# Patient Record
Sex: MALE | Race: WHITE | Employment: FULL TIME | URBAN - METROPOLITAN AREA
[De-identification: names, ages, dates, MRNs, and addresses within clinical notes are randomized per-mention and may not be internally consistent; named-entity substitution may affect disease eponyms.]

---

## 2017-09-05 ENCOUNTER — TELEPHONE (OUTPATIENT)
Dept: ORTHOPEDIC SURGERY | Age: 42
End: 2017-09-05

## 2017-09-11 ENCOUNTER — OFFICE VISIT (OUTPATIENT)
Dept: ORTHOPEDIC SURGERY | Age: 42
End: 2017-09-11

## 2017-09-11 VITALS — RESPIRATION RATE: 16 BRPM | HEIGHT: 74 IN | WEIGHT: 208 LBS | BODY MASS INDEX: 26.69 KG/M2

## 2017-09-11 DIAGNOSIS — R52 PAIN: ICD-10-CM

## 2017-09-11 DIAGNOSIS — S62.339A BOXERS FRACTURE, CLOSED, INITIAL ENCOUNTER: Primary | ICD-10-CM

## 2017-09-11 PROCEDURE — 99203 OFFICE O/P NEW LOW 30 MIN: CPT | Performed by: ORTHOPAEDIC SURGERY

## 2017-09-11 PROCEDURE — 73130 X-RAY EXAM OF HAND: CPT | Performed by: ORTHOPAEDIC SURGERY

## 2017-09-11 PROCEDURE — 26600 TREAT METACARPAL FRACTURE: CPT | Performed by: ORTHOPAEDIC SURGERY

## 2017-09-11 ASSESSMENT — ENCOUNTER SYMPTOMS
EYES NEGATIVE: 1
RESPIRATORY NEGATIVE: 1
GASTROINTESTINAL NEGATIVE: 1

## 2017-09-18 ENCOUNTER — OFFICE VISIT (OUTPATIENT)
Dept: ORTHOPEDIC SURGERY | Age: 42
End: 2017-09-18

## 2017-09-18 DIAGNOSIS — R52 PAIN: Primary | ICD-10-CM

## 2017-09-18 DIAGNOSIS — S62.339A BOXERS FRACTURE, CLOSED, INITIAL ENCOUNTER: ICD-10-CM

## 2017-09-18 PROCEDURE — 73130 X-RAY EXAM OF HAND: CPT | Performed by: ORTHOPAEDIC SURGERY

## 2017-09-18 PROCEDURE — 99024 POSTOP FOLLOW-UP VISIT: CPT | Performed by: ORTHOPAEDIC SURGERY

## 2017-10-02 ENCOUNTER — OFFICE VISIT (OUTPATIENT)
Dept: ORTHOPEDIC SURGERY | Age: 42
End: 2017-10-02

## 2017-10-02 VITALS — WEIGHT: 208 LBS | BODY MASS INDEX: 26.69 KG/M2 | HEIGHT: 74 IN | RESPIRATION RATE: 16 BRPM

## 2017-10-02 DIAGNOSIS — R52 PAIN: ICD-10-CM

## 2017-10-02 DIAGNOSIS — S62.339A BOXERS FRACTURE, CLOSED, INITIAL ENCOUNTER: Primary | ICD-10-CM

## 2017-10-02 PROCEDURE — 99024 POSTOP FOLLOW-UP VISIT: CPT | Performed by: ORTHOPAEDIC SURGERY

## 2017-10-02 PROCEDURE — 73130 X-RAY EXAM OF HAND: CPT | Performed by: ORTHOPAEDIC SURGERY

## 2017-10-02 NOTE — PROGRESS NOTES
Scribe Authentication Statement  Laurel Olson, scribed portions of this documentation for and in the presence of Dr. Netta Shields DO on 10/2/17 at 11:06 AM.    Provider Authentication Statement:  I, Jeane Vasquez DO, personally performed the services described in this documentation and they were scribed in my presence by the above listed scribe. The documentation is both accurate and complete. ORTHOPEDIC FOLLOW UP FOR CLOSED TREATMENT OF FRACTURE    HISTORY OF PRESENT ILLNESS (HPI):   Eddie Barraza is a 36y.o. year old male who is here for follow up of   1. Boxers fracture, closed, initial encounter    2. Pain     which occurred on 09/03/17. (About 2 weeks ago)    Change since last visit:    intermittent   Location now is: at the fracture site   Quality now is: aching   Severity: Has improved   Adverse effects or complications: None   Other comments: Patient is still wearing his plaster cast in good condition     Current Outpatient Prescriptions   Medication Sig Dispense Refill    ibuprofen (ADVIL;MOTRIN) 600 MG tablet Take 1 tablet by mouth every 6 hours as needed for Pain 30 tablet 1     No current facility-administered medications for this visit.          ORTHOPEDIC EXAM:     [] Left Upper Extremity [x] Right Upper Extremity  [] Left Lower Extremity [] Right Lower Extremity    Inspection:  Circulation: [x] Warm, well perfused, good capillary refill  [] Cool  [] Sluggish cap refill  [] Other:   Skin: [x] Intact without discoloration  [] Pale  [] Erythematous  [] Ecchymotic   [] Maceration  [] Other:  [] Wound:   Cast/Splint: [x] Intact, dry, and functional without any unexpected wear  [] Poorly fitting  [] Wet  [] Foul smelling  [] Broken  [] Patient altered  [] Foreign body within  [] Soft  [] Other:     Palpation:  Tenderness: [] None  [x] No more than expected  [] Pertinent positives & negatives:   Swelling: [] None  [x] No more than expected  [] Pertinent positives & negatives:   Calor (Heat) [x]

## 2017-10-02 NOTE — MR AVS SNAPSHOT
your BMI by decreasing your calorie intake and becoming more physically active. Learn more at: [email or web address]             Medications and Orders      Your Current Medications Are              ibuprofen (ADVIL;MOTRIN) 600 MG tablet Take 1 tablet by mouth every 6 hours as needed for Pain      Allergies              Phenergan [Promethazine Hcl] Other (See Comments)    Muscle spasms      We Ordered/Performed the following           XR HAND RIGHT (MIN 3 VIEWS)          Result Summary for XR HAND RIGHT (MIN 3 VIEWS)      Result Information     Status          Final result (Exam End: 10/2/2017 11:06 AM)           10/2/2017 11:06 AM      Narrative & Impression           Radiology exam is complete. No Radiologist dictation. Please follow up with ordering provider. Additional Information        Basic Information     Date Of Birth Sex Race Ethnicity Preferred Language    1975 Male White Non-/Non  English      Problem List as of 10/2/2017  Date Reviewed: 9/3/2017                Boxers fracture      Immunizations as of 10/2/2017     Name Date    Tdap (Boostrix, Adacel) 10/4/2014      Preventive Care        Date Due    HIV screening is recommended for all people regardless of risk factors  aged 15-65 years at least once (lifetime) who have never been HIV tested. 4/8/1989    Cholesterol Screening 4/8/2014    Diabetes Screening 4/8/2014    Yearly Flu Vaccine (1) 9/1/2017    Tetanus Combination Vaccine (2 - Td) 10/4/2024            [employer] Signup           [employer] allows you to send messages to your doctor, view your test results, renew your prescriptions, schedule appointments, view visit notes, and more. How Do I Sign Up? 1. In your Internet browser, go to [email or web address]  2. Click on the Sign Up Now link in the Sign In box. You will see the New Member Sign Up page.

## 2017-10-16 ENCOUNTER — OFFICE VISIT (OUTPATIENT)
Dept: ORTHOPEDIC SURGERY | Age: 42
End: 2017-10-16

## 2017-10-16 VITALS — HEIGHT: 74 IN | RESPIRATION RATE: 16 BRPM | BODY MASS INDEX: 26.69 KG/M2 | WEIGHT: 208 LBS

## 2017-10-16 DIAGNOSIS — R52 PAIN: ICD-10-CM

## 2017-10-16 DIAGNOSIS — S62.339D CLOSED BOXER'S FRACTURE WITH ROUTINE HEALING, SUBSEQUENT ENCOUNTER: Primary | ICD-10-CM

## 2017-10-16 PROCEDURE — 99024 POSTOP FOLLOW-UP VISIT: CPT | Performed by: PHYSICIAN ASSISTANT

## 2017-10-16 PROCEDURE — 73130 X-RAY EXAM OF HAND: CPT | Performed by: PHYSICIAN ASSISTANT

## 2017-10-16 NOTE — LETTER
[facility]  [address]  [facility] [address]  Phone: [phone]  Fax: [phone]    Mel Garay        October 16, 2017     Patient: Nathalia Israel   YOB: 1975   Date of Visit: 10/16/2017       To Whom It May Concern: It is my medical opinion that Jesus Barnes is unable to return to work for 6 weeks. If you have any questions or concerns, please don't hesitate to call.     Sincerely,        Misbah Zelaya PA-C

## 2017-10-16 NOTE — PROGRESS NOTES
Scribe Authentication Statement  Matt Senior, scribed portions of this documentation for and in the presence of Bruce Bradford PA-C on 10/16/17 at 11:04 AM.      Mr. Carissa Booth returns today in follow-up of his previously treated  right closed boxer fracture. He returns today stating that the overall course is gradually improving. The patient's hand pain is rated 0/10 today and is described as none. Aggravating factors are none. Pain is relieved rest. No new orthopaedic complaints. The patient's past medical history, medications, allergies,  family history, social history, and review of systems have been reviewed and are recorded in the chart. There are not any recent changes in their medical history. Physical Exam:  Vitals  Resp: 16  Height: 6' 2\" (188 cm)  Weight: 208 lb (94.3 kg)  Mr. Carissa Booth appears well, he is in no apparent distress, he demonstrates appropriate mood & affect. Gait is normal.      Cast Removal:  The previously applied cast is carefully removed from the patient. The patient reports no discomfort from the cast.  There are no unexpected integumentary findings beneath the cast upon removal.  The patient's neurovascular status remains unchanged. right hand exam:  Skin: Skin color, texture, turgor normal. No rashes or lesions. Sensation is subjectively and objectively normal in the extremity. Vascular examination reveals 2+ radial and brachial.  Muscular strength is clinically appropriate bilaterally. right hand exam:  -Swelling is absent   -no tenderness to palpation of the fracture site or 5th metacarpal globally  -ROM is diminished range with pain  -there is no significant instability     IMAGES:  3 views of the right hand were obtained and reviewed which reveals 5th metacarpal fracture, mildly angulated with bony callous formation. No change in fracture pattern or alignment in comparison to injury films.      Impression:  right closed boxer's fracture, healed     Plan:The diagnosis and treatment plan was discussed in length with the patient with all questions answered. They are in agreement. 1. Cast removed today  2. Hand Therapy ordered   3. Call once this is completed for work release   4. Apply ice to the fracture site if the pain is increased after getting the cast removed   5. Follow up as needed       Provider Authentication Statement  Kina Palacios PA-C,  personally performed the services described in this documentation and they were scribed in my presence by the above listed scribe.    The documentation is both accurate and complete

## 2017-10-18 ENCOUNTER — HOSPITAL ENCOUNTER (OUTPATIENT)
Dept: OCCUPATIONAL THERAPY | Age: 42
Discharge: HOME | End: 2017-10-31
Attending: PHYSICIAN ASSISTANT | Admitting: PHYSICIAN ASSISTANT

## 2017-10-18 ASSESSMENT — PAIN SCALES - GENERAL: PAINLEVEL_OUTOF10: 8

## 2017-10-18 NOTE — PROGRESS NOTES
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 Ext. Rot. Elbow & Forearm         Flex / Ext       67 60 Supination        WFL Pronation         Wrist       52 45 Flexion       80 60 Extension       25 15 Ulnar Dev. 20 Radial Dev. Thumb          [facility] Radial Abd. [facility] Palmar Abd         MP         IP        Finger Extension/Flexion   RIGHT    Index  Long Ring Small    MPs  [facility] 15/60  (0/85) 25/40  (0/80)   PIPs  WFL [facility] 0/43  (0/60) 5/50  (0/55)   DIPs [facility] 0/40  (0/50) 0/30  (0/40)       Hand Strength Right Left     39 103   Lateral Pinch 13 15   Alicia Pinch 14 22   Tip Pinch          Edema Right  Left    Hand Volumeter     Circumference: Palm      Wrist Crease     Base of Index     Base of Long     Base of Ring 8.5cm    Base of Small  7.5cm                          Barriers to Learning:            No barriers noted           Assessment   Assessment  Performance deficits / Impairments: Decreased ROM; Decreased strength;Decreased coordination;Decreased

## 2017-10-18 NOTE — FLOWSHEET NOTE
increase R supination to WNL  Long term goal 4: Pt. will increase R  to 80#   Long term goal 5: Pt. will increase R pinches 5#       Patient Requires Follow-up:  [x]  Yes  []  No    Plan: []  Continue per plan of care []  Alter current plan (see comments)   [x]  Plan of care initiated []  Hold pending MD visit []  Discharge    Plan for Next Session:      Electronically signed by:  BALA Bedoya/L, 10/18/2017, 6:40 PM

## 2017-10-18 NOTE — PLAN OF CARE
[x][facility] [address]      N[address]       [facility], [address] [facility], [address]     [phone] [phone] CZZ:[phone]  ________________________________________________________________________    Physician: Venessa Dey PA-C  From: [facility]  Patient: Brea De La Rosa      : 1975  Diagnosis:  Boxers fx   Physician ICD 10 Code: W62.887   Treatment Diagnosis:  weakness  ICD10 tx code: R53.1  Date: 10/18/2017     MRN: [id number]     Occupational Therapy Certification/Re-Certification Form  Dear Venessa Dey  The following patient has been evaluated for occupational therapy services and for therapy to continue, insurance requires physician review of the treatment plan initially and every 90 days. Please review the attached evaluation and/or summary of the patient's plan of care, and verify that you agree therapy should continue by signing the attached document and sending it back to our office. Plan of Care/Treatment to date:  [x] Therapeutic Exercise   [x] Modalities:  [x] Therapeutic Activity    [] Ultrasound [] Elec Stimulation   [] Total Motion Release    [] Fluido [] Kinesiotaping  [] Neuromuscular Re-education   [] Ionto [x] Coldpack/hotpack   [x] Instruction in HEP    Other:  [] Manual Therapy     []   [] Aquatic Therapy     [] ? Frequency/Duration:  # Days per week: [] 1 day # Weeks: [] 1 week [] 5 weeks     [x] 2 days?    [] 2 weeks [] 6 weeks     [] 3 days   [] 3 weeks [] 7 weeks     [] 4 days   [x] 4 weeks [] 8 weeks         [] 9 weeks [] 10 weeks         [] 11 weeks [] 12 weeks    Rehab Potential/Progress: [] excellent [x] good [] fair  [] poor       Goals:     Long term goal 1: Pt. will increase R wrist AROM to WNL   Long term goal 2: Pt. will increase R ring and small finger AROM to WNL  Long term goal 3: Pt. will increase R supination to WNL  Long term goal 4: Pt. will increase R  to 80#   Long term goal 5: Pt. will increase R pinches 5#         Electronically signed by:  Natasha Cates, 10/18/2017, 6:34 PM    If you have any questions or concerns, please don't hesitate to call.   Thank you for your referral.      Physician Signature:__________________   Date:_____________ Time: _______  By signing above, therapists plan is approved by physician

## 2017-10-20 ENCOUNTER — HOSPITAL ENCOUNTER (OUTPATIENT)
Dept: OCCUPATIONAL THERAPY | Age: 42
Discharge: HOME | End: 2017-10-20

## 2017-10-20 NOTE — FLOWSHEET NOTE
goal 1: Pt. will increase R wrist AROM to WNL   Long term goal 2: Pt. will increase R ring and small finger AROM to WNL  Long term goal 3: Pt. will increase R supination to WNL  Long term goal 4: Pt. will increase R  to 80#   Long term goal 5: Pt. will increase R pinches 5#       Patient Requires Follow-up:  [x]  Yes  []  No    Plan: [x]  Continue per plan of care []  Alter current plan (see comments)   []  Plan of care initiated []  Hold pending MD visit []  Discharge    Plan for Next Session:      Electronically signed by:  BALA Bedoya/L, 10/20/2017, 5:29 PM

## 2017-10-23 ENCOUNTER — HOSPITAL ENCOUNTER (OUTPATIENT)
Dept: OCCUPATIONAL THERAPY | Age: 42
Discharge: HOME | End: 2017-10-23

## 2017-10-23 NOTE — FLOWSHEET NOTE
[x][facility] [address]      [address] [facility] [address]       [facility], [address], [address]       [address]     [phone] [phone] [phone]  ________________________________________________________________________  Occupational Therapy Daily Treatment Note    Date:  10/23/2017  Patient Name:  Chapin Trevino    :  1975  Restrictions/Precautions:  none  Diagnosis:   Boxers fx  Treatment Diagnosis:  weakness  Insurance/Certification information: [place] 90 visits combined PT/OT/ST   Referring Physician:   Ricki Thomas  Plan of care signed (Y/N):    Visit# / total visits: 3/10  Pain level: 8-3/82 With certain movements    Subjective: states he keeps exercising to get his hand working.  Still swells  Prior Level of Function:  independent  Patient Goals:     Treatment Flowsheet   Right Left                          MEM x      Gentle ROM x         digiflex 5# x      Pinch pin 1# with thumb and ring/little finger x      Wrist flex/ext 1# x 20 x      Supinator 8 oz. x             Issued compression glove and instructed in wear and care x                                                       Interventions/Modalities used:  [x] Therapeutic Exercise   [] Modalities:  [] Therapeutic Activity    [] Ultrasound [] Elec Stimulation   [] Total Motion Release    [] Fluido [] Kinesiotaping  [] Neuromuscular Re-education   [] Ionto [] Coldpack/hotpack   [x] Instruction in HEP    Other:    Objective Findings: R   46.6# PIP flex RRF 70 RSF 65    Communication with other providers:  fax'd POC to physician    Education provided to patient:    Adverse Reactions to treatment:    Timed Code Treatment Minutes:  45    Total Treatment Minutes: 45    Treatment/Activity Tolerance:     [x]  Patient tolerated treatment well []  Patient limited by fatique    []  Patient limited by pain []  Patient limited by other medical complications   []  Other:     Goals:    Long term goal 1: Pt. will increase R wrist AROM to WNL   Long term goal 2: Pt. will increase R ring and small finger AROM to WNL  Long term goal 3: Pt. will increase R supination to WNL  Long term goal 4: Pt. will increase R  to 80#   Long term goal 5: Pt. will increase R pinches 5#       Patient Requires Follow-up:  [x]  Yes  []  No    Plan: [x]  Continue per plan of care []  Alter current plan (see comments)   []  Plan of care initiated []  Hold pending MD visit []  Discharge    Plan for Next Session:      Electronically signed by:  ERAN Hays, 10/23/2017, 6:23 PM

## 2017-10-25 ENCOUNTER — HOSPITAL ENCOUNTER (OUTPATIENT)
Dept: OCCUPATIONAL THERAPY | Age: 42
Discharge: HOME | End: 2017-10-25

## 2017-10-25 NOTE — FLOWSHEET NOTE
[x][facility] [address]      [address] [facility] [address]       [facility], [address], [address]       [address]     [phone] [phone]  ________________________________________________________________________  Occupational Therapy Daily Treatment Note    Date:  10/25/2017  Patient Name:  Chapin Trevino    :  1975  Restrictions/Precautions:  none  Diagnosis:   Boxers fx  Treatment Diagnosis:  weakness  Insurance/Certification information: [place] 90 visits combined PT/OT/ST   Referring Physician:   Ricki Thomas  Plan of care signed (Y/N):    Visit# / total visits: 4/10  Pain level: 5- With certain movements    Subjective: \" I have been working with my hands because I want to get back to work\"  Prior Level of Function:  independent  Patient Goals:     Treatment Flowsheet   Right Left                          MEM x      Gentle ROM x         digiflex 5# x      Pinch pin 2# with thumb and ring/little finger x      Wrist flex/ext 2# x 20 x      Supinator 8 oz. x             Issued compression glove and instructed in wear and care x                                                       Interventions/Modalities used:  [x] Therapeutic Exercise   [] Modalities:  [] Therapeutic Activity    [] Ultrasound [] Elec Stimulation   [] Total Motion Release    [] Fluido [] Kinesiotaping  [] Neuromuscular Re-education   [] Ionto [] Coldpack/hotpack   [x] Instruction in HEP    Other:    Objective Findings:  Edema still present; wrist ext 75  Wrist flex 60 ;  R  59.9#  Lateral pinch 18#   Alicia pinch 17#    Finger Extension/Flexion            RIGHT     Index  Long Ring Small    MPs  [facility] 15/80  ()   (0)   PIPs  WFL WFL 0  (0/60)   (0)   [facility]   [phone]  [phone]          Communication with other providers:  fax'd POC to physician    Education provided to patient:    Adverse Reactions to treatment:    Timed Code Treatment Minutes:  45    Total Treatment Minutes: 45    Treatment/Activity Tolerance:     [x]  Patient tolerated treatment well []  Patient limited by fatique    []  Patient limited by pain []  Patient limited by other medical complications   []  Other:     Goals:    Long term goal 1: Pt. will increase R wrist AROM to WNL   Long term goal 2: Pt. will increase R ring and small finger AROM to WNL  Long term goal 3: Pt. will increase R supination to WNL  Long term goal 4: Pt. will increase R  to 80#   Long term goal 5: Pt. will increase R pinches 5#       Patient Requires Follow-up:  [x]  Yes  []  No    Plan: [x]  Continue per plan of care []  Alter current plan (see comments)   []  Plan of care initiated []  Hold pending MD visit []  Discharge    Plan for Next Session:      Electronically signed by:  ERAN Bedoya, 10/25/2017, 3:39 PM

## 2017-10-27 ENCOUNTER — HOSPITAL ENCOUNTER (OUTPATIENT)
Dept: OCCUPATIONAL THERAPY | Age: 42
Discharge: HOME | End: 2017-10-27

## 2017-10-27 NOTE — FLOWSHEET NOTE
[x][facility] [address]      NICO [facility] [address]       [facility] [address], [address], [address]       [address]     [phone] [phone]  ________________________________________________________________________  Occupational Therapy Daily Treatment Note    Date:  10/27/2017  Patient Name:  Galileo Machuca    :  1975  Restrictions/Precautions:  none  Diagnosis:   Boxers fx  Treatment Diagnosis:  weakness  Insurance/Certification information: [place] 90 visits combined PT/OT/ST   Referring Physician:   Andrey Conner  Plan of care signed (Y/N):    Visit# / total visits: 5/10  Pain level:  With certain movements    Subjective: states he is going to do some baking this weekend  Prior Level of Function:  independent  Patient Goals:     Treatment Flowsheet   Right Left                          MEM x      Gentle ROM x         digiflex 5# x      Pinch pin 2# with thumb and ring/little finger x      Wrist flex/ext 2# x 20 x      Supinator 8 oz. x             Issued compression glove and instructed in wear and care x                                                       Interventions/Modalities used:  [x] Therapeutic Exercise   [] Modalities:  [] Therapeutic Activity    [] Ultrasound [] Elec Stimulation   [] Total Motion Release    [] Fluido [] Kinesiotaping  [] Neuromuscular Re-education   [] Ionto [] Coldpack/hotpack   [x] Instruction in HEP    Other:    Objective Findings:  Edema still present; wrist ext 75  Wrist flex 60 ;  R  68.1#  Lateral pinch 18#   Alicia pinch 17#    Finger Extension/Flexion            RIGHT     Index  Long Ring Small    MPs  [facility] 15/80  (0/) 25/80  (0/80)   PIPs  WFL   (0/60)   (0/55)   DIPs WFL   (0/50) 040  (0/40)          Communication with other providers:  fax'd POC to physician    Education provided to patient:    Adverse Reactions to treatment:    Timed Code Treatment Minutes:  45    Total Treatment Minutes: 45    Treatment/Activity Tolerance:     [x]  Patient tolerated treatment well []  Patient limited by fatique    []  Patient limited by pain []  Patient limited by other medical complications   []  Other:     Goals:    Long term goal 1: Pt. will increase R wrist AROM to WNL   Long term goal 2: Pt. will increase R ring and small finger AROM to WNL  Long term goal 3: Pt. will increase R supination to WNL  Long term goal 4: Pt. will increase R  to 80#   Long term goal 5: Pt. will increase R pinches 5#       Patient Requires Follow-up:  [x]  Yes  []  No    Plan: [x]  Continue per plan of care []  Alter current plan (see comments)   []  Plan of care initiated []  Hold pending MD visit []  Discharge    Plan for Next Session:      Electronically signed by:  ERAN Bedoya, 10/27/2017, 3:31 PM

## 2017-11-01 ENCOUNTER — HOSPITAL ENCOUNTER (OUTPATIENT)
Dept: OCCUPATIONAL THERAPY | Age: 42
Discharge: HOME | End: 2017-11-01

## 2017-11-01 ENCOUNTER — HOSPITAL ENCOUNTER (OUTPATIENT)
Dept: OTHER | Age: 42
Discharge: HOME | End: 2017-11-30
Attending: PHYSICIAN ASSISTANT | Admitting: PHYSICIAN ASSISTANT

## 2017-11-03 ENCOUNTER — HOSPITAL ENCOUNTER (OUTPATIENT)
Dept: OCCUPATIONAL THERAPY | Age: 42
Discharge: HOME | End: 2017-11-03

## 2017-11-03 NOTE — FLOWSHEET NOTE
patient:    Adverse Reactions to treatment:    Timed Code Treatment Minutes:  45    Total Treatment Minutes: 45    Treatment/Activity Tolerance:     [x]  Patient tolerated treatment well []  Patient limited by fatique    []  Patient limited by pain []  Patient limited by other medical complications   []  Other:     Goals:    Long term goal 1: Pt. will increase R wrist AROM to WNL   Long term goal 2: Pt. will increase R ring and small finger AROM to WNL  Long term goal 3: Pt. will increase R supination to WNL  Long term goal 4: Pt. will increase R  to 80#   Long term goal 5: Pt. will increase R pinches 5#       Patient Requires Follow-up:  [x]  Yes  []  No    Plan: [x]  Continue per plan of care []  Alter current plan (see comments)   []  Plan of care initiated []  Hold pending MD visit []  Discharge    Plan for Next Session:      Electronically signed by:  ERAN Bedoya, 11/3/2017, 4:59 PM

## 2017-11-08 ENCOUNTER — HOSPITAL ENCOUNTER (OUTPATIENT)
Dept: OCCUPATIONAL THERAPY | Age: 42
Discharge: HOME | End: 2017-11-08

## 2017-11-08 NOTE — FLOWSHEET NOTE
[x][facility] [address]      NICO [facility] [address]       [facility] [address], [address], [address]       [address]     [phone]  ________________________________________________________________________  Occupational Therapy Daily Treatment Note    Date:  2017  Patient Name:  Brittnee Hernandes    :  1975  Restrictions/Precautions:  none  Diagnosis:   Boxers fx  Treatment Diagnosis:  weakness  Insurance/Certification information: [place] 90 visits combined PT/OT/ST   Referring Physician:   Naheed Lewis  Plan of care signed (Y/N):    Visit# / total visits:8/10  Pain level: 5-6/10 With at end range no pain at rest    Subjective:  States \"I need to get better soon my disability will be running out. \"  Prior Level of Function:  independent  Patient Goals:     Treatment Flowsheet   Right Left                           x      Gentle ROM x         digiflex 5# x      Pinch pin 2# with thumb and ring/little finger x      Wrist flex/ext 2# x 20 x      Supinator 1# x             Issued compression glove and instructed in wear and care Continues to wear                                                       Interventions/Modalities used:  [x] Therapeutic Exercise   [] Modalities:  [] Therapeutic Activity    [] Ultrasound [] Elec Stimulation   [] Total Motion Release    [] Fluido [] Kinesiotaping  [] Neuromuscular Re-education   [] Ionto [] Coldpack/hotpack   [x] Instruction in HEP    Other:    Objective Findings:  Edema still present RSF and RIF; wrist ext 78  Wrist flex 70 ;  R  67.7#  Lateral pinch 17#   Alicia pinch 17#    Finger Extension/Flexion            RIGHT     Index  Long Ring Small    MPs  [facility] 15/80  (0)   (0)   PIPs  WFL WFL 0  (0/60)   (0)   [facility] 050  (0/50) 040  (0/40)          Communication with other providers:

## 2017-11-15 ENCOUNTER — HOSPITAL ENCOUNTER (OUTPATIENT)
Dept: OCCUPATIONAL THERAPY | Age: 42
Discharge: HOME | End: 2017-11-15

## 2017-11-15 NOTE — FLOWSHEET NOTE
[x][facility] [address]      NICO [facility] [address]       [facility], [address], [address]       RaviGene Edwards [address]     [phone] [phone] [phone]  ________________________________________________________________________  Occupational Therapy Daily Treatment Note    Date:  11/15/2017  Patient Name:  Brea De La Rosa    :  1975  Restrictions/Precautions:  none  Diagnosis:   Boxers fx  Treatment Diagnosis:  weakness  Insurance/Certification information: [place] 90 visits combined PT/OT/ST   Referring Physician:   Venessa Dey  Plan of care signed (Y/N):    Visit# / total visits:9/10  Pain level: 5-6/10 With at end range no pain at rest    Subjective: recommended pt. Make a doctor appt.  To talk about going back to work   Prior Level of Function:  independent  Patient Goals:     Treatment Flowsheet   Right Left                           x      Gentle ROM x         digiflex 7# x      Pinch pin 4# with thumb and ring/little finger x      Wrist flex/ext 2# x 20 x      Supinator 1.5# x             Issued compression glove and instructed in wear and care Continues to wear                                                       Interventions/Modalities used:  [x] Therapeutic Exercise   [] Modalities:  [] Therapeutic Activity    [] Ultrasound [] Elec Stimulation   [] Total Motion Release    [] Fluido [] Kinesiotaping  [] Neuromuscular Re-education   [] Ionto [] Coldpack/hotpack   [x] Instruction in HEP    Other:    Objective Findings:  Edema still present RSF and RIF; wrist ext 90  Wrist flex 70 ;  R  77.9#  Lateral pinch 17#   Alicia pinch 17#    Finger Extension/Flexion            RIGHT     Index  Long Ring Small    MPs  [facility] 15/90  (0) 0/80  (0)   PIPs  WFL WFL 0/76  (0) 0/75  (0)   DIPs WFL   (050) 0  (0/)          Communication with other providers: Education provided to patient: Continue with theraputty    Adverse Reactions to treatment:  none  Timed Code Treatment Minutes:  45    Total Treatment Minutes: 45    Treatment/Activity Tolerance:     [x]  Patient tolerated treatment well []  Patient limited by fatique    [x]  Patient limited by pain []  Patient limited by other medical complications   []  Other:     Goals:    Long term goal 1: Pt. will increase R wrist AROM to WNL   Long term goal 2: Pt. will increase R ring and small finger AROM to WNL  Long term goal 3: Pt. will increase R supination to WNL  Long term goal 4: Pt. will increase R  to 80#   Long term goal 5: Pt. will increase R pinches 5#       Patient Requires Follow-up:  [x]  Yes  []  No    Plan: [x]  Continue per plan of care []  Alter current plan (see comments)   []  Plan of care initiated []  Hold pending MD visit []  Discharge    Plan for Next Session:      Electronically signed by:  Evangelist BARILLAS 11/15/2017 9:40 AM

## 2017-11-17 ENCOUNTER — HOSPITAL ENCOUNTER (OUTPATIENT)
Dept: OCCUPATIONAL THERAPY | Age: 42
Discharge: HOME | End: 2017-11-17

## 2017-11-17 ENCOUNTER — OFFICE VISIT (OUTPATIENT)
Dept: ORTHOPEDIC SURGERY | Age: 42
End: 2017-11-17

## 2017-11-17 VITALS — WEIGHT: 208 LBS | HEIGHT: 74 IN | BODY MASS INDEX: 26.69 KG/M2 | RESPIRATION RATE: 16 BRPM

## 2017-11-17 DIAGNOSIS — Z09: Primary | ICD-10-CM

## 2017-11-17 DIAGNOSIS — S62.339D CLOSED BOXER'S FRACTURE WITH ROUTINE HEALING, SUBSEQUENT ENCOUNTER: ICD-10-CM

## 2017-11-17 PROCEDURE — 99024 POSTOP FOLLOW-UP VISIT: CPT | Performed by: PHYSICIAN ASSISTANT

## 2017-11-17 NOTE — FLOWSHEET NOTE
[x][facility] [address]      NICO [facility] [address]  [address]       [facility], [address], [address]       Gene Garcia 61     [phone] [phone] [phone]  ________________________________________________________________________  Occupational Therapy Daily Treatment Note    Date:  2017  Patient Name:  Milton Solitario    :  1975  Restrictions/Precautions:  none  Diagnosis:   Boxers fx  Treatment Diagnosis:  weakness  Insurance/Certification information: [place] 90 visits combined PT/OT/ST   Referring Physician:   Guevara See  Plan of care signed (Y/N):    Visit# / total [id number]  Pain level: 6/10 in ring finger when pushing to end range; 0/10 at rest    Subjective: pt. Going to see doctor today; hopes to get back to work  Prior Level of Function:  independent  Patient Goals:     Treatment Flowsheet   Right Left                              ROM and stretching to 4th and 5th digits x         digiflex 9# x      Pinch pin 6# with thumb and ring/little finger x      Wrist flex/ext 4# x 20 x      Supinator 1.5# x             Issued compression glove and instructed in wear and care Continues to wear                                                       Interventions/Modalities used:  [x] Therapeutic Exercise   [] Modalities:  [] Therapeutic Activity    [] Ultrasound [] Elec Stimulation   [] Total Motion Release    [] Fluido [] Kinesiotaping  [] Neuromuscular Re-education   [] Ionto [] Coldpack/hotpack   [x] Instruction in HEP    Other:    Objective Findings:  Edema still present RSF and RIF; wrist ext 90  Wrist flex 70 ;  R  77.9#  Lateral pinch 17#   Alicia pinch 17#    Finger Extension/Flexion            RIGHT     Index  Long Ring Small    MPs  [facility] 15/90  (0) 0  (0)   PIPs  WFL WFL 0  (0) 0  (0)   DIPs WFL   (050) 0  (0)          Communication with other providers:      Education provided to patient: Continue with theraputty    Adverse Reactions to treatment:  none  Timed Code Treatment Minutes:  45    Total Treatment Minutes: 45    Treatment/Activity Tolerance:     [x]  Patient tolerated treatment well []  Patient limited by fatique    [x]  Patient limited by pain []  Patient limited by other medical complications   []  Other:     Goals:    Long term goal 1: Pt. will increase R wrist AROM to WNL   Long term goal 2: Pt. will increase R ring and small finger AROM to WNL  Long term goal 3: Pt. will increase R supination to WNL  Long term goal 4: Pt. will increase R  to 80#   Long term goal 5: Pt. will increase R pinches 5#               Patient Requires Follow-up:  [x]  Yes  []  No    Plan: [x]  Continue per plan of care []  Alter current plan (see comments)   []  Plan of care initiated []  Hold pending MD visit []  Discharge    Plan for Next Session:      Electronically signed by:  Enma BARILLAS 11/17/2017 9:13 AM

## 2017-11-20 ENCOUNTER — HOSPITAL ENCOUNTER (OUTPATIENT)
Dept: OCCUPATIONAL THERAPY | Age: 42
Discharge: HOME | End: 2017-11-20

## 2017-11-20 NOTE — PLAN OF CARE
[x][facility] [address]      NICO [facility] [address]       [facility], [address], [address] [facility]       SaraGene guillermo [address]     [phone] [phone] [phone]  ________________________________________________________________________    Physician: Adalgisa Finn PA-C  From: Otilia Cash  Patient: Jeremiah Rivas      : 1975  Diagnosis:  Boxers fx   Physician ICD 10 Code: J93.859   Treatment Diagnosis:  weakness  ICD10 tx code: R53.1  Date: 2017     MRN: [id number]     Occupational Therapy Certification/Re-Certification Form  Dear Adalgisa Finn  The following patient has been evaluated for occupational therapy services and for therapy to continue, insurance requires physician review of the treatment plan initially and every 90 days. Please review the attached evaluation and/or summary of the patient's plan of care, and verify that you agree therapy should continue by signing the attached document and sending it back to our office. Plan of Care/Treatment to date:  [x] Therapeutic Exercise   [x] Modalities:  [x] Therapeutic Activity    [] Ultrasound [] Elec Stimulation   [] Total Motion Release    [] Fluido [] Kinesiotaping  [] Neuromuscular Re-education   [] Ionto [x] Coldpack/hotpack   [x] Instruction in HEP    Other:  [] Manual Therapy     []   [] Aquatic Therapy     [] ? Frequency/Duration:  # Days per week: [x] 1 day # Weeks: [] 1 week [] 5 weeks     [] 2 days?    [] 2 weeks [] 6 weeks     [] 3 days   [] 3 weeks [] 7 weeks     [] 4 days   [x] 4 weeks [] 8 weeks         [] 9 weeks [] 10 weeks         [] 11 weeks [] 12 weeks    Rehab Potential/Progress: [] excellent [x] good [] fair  [] poor       Goals:     Long term goal 1: Pt. will increase R wrist AROM to WNL   Long term goal 2: Pt. will increase R ring and small finger AROM to WNL  Long term goal 3: Pt. will increase R supination to WNL  Long term goal 4: Pt. will increase R  to 80#   Long term goal 5: Pt. will increase R pinches 5#         Electronically signed by:  BALA Castro/L, 11/20/2017, 6:17 PM    If you have any questions or concerns, please don't hesitate to call.   Thank you for your referral.      Physician Signature:__________________   Date:_____________ Time: _______  By signing above, therapists plan is approved by physician

## 2017-11-20 NOTE — FLOWSHEET NOTE
[x][facility] [address]      NICO [facility] [address]       [facility], [address], [address]       Gene Cardenas [address]     [phone] [phone] [phone]  ________________________________________________________________________  Occupational Therapy Daily Treatment Note    Date:  2017  Patient Name:  Prentice Cogan    :  1975  Restrictions/Precautions:  none  Diagnosis:   Boxers fx  Treatment Diagnosis:  weakness  Insurance/Certification information: [place] 90 visits combined PT/OT/ST   Referring Physician:   Tylor Carter  Plan of care signed (Y/N):    Visit# / total visit  11  Pain level: 6/10 in ring finger when pushing to end range; 0/10 at rest    Subjective: States saw doctor and wants to continue one x a week. Started back to work today.   Prior Level of Function:  independent  Patient Goals:     Treatment Flowsheet   Right Left                   massage x    MEM x     ROM and stretching to 4th and 5th digits x         digiflex 9# x      Pinch pin 6# with thumb and ring/little finger x      Wrist flex/ext 4# x 20 x      Supinator 1.5# x             Issued compression glove and instructed in wear and care Continues to wear                                                       Interventions/Modalities used:  [x] Therapeutic Exercise   [] Modalities:  [] Therapeutic Activity    [] Ultrasound [] Elec Stimulation   [] Total Motion Release    [] Fluido [] Kinesiotaping  [] Neuromuscular Re-education   [] Ionto [] Coldpack/hotpack   [x] Instruction in HEP    Other:    Objective Findings:  Edema still present RSF and RIF; wrist ext 90  Wrist flex 70 ;  R  77.9#  Lateral pinch 17#   Alicia pinch 17#-last session    Finger Extension/Flexion            RIGHT     Index  Long Ring Small    MPs  [facility] 15/90  () 0  (0)   PIPs  WFL WFL 0  () 0  (0)   DIPs [facility] 0/50  (0/50) 0/40  (0/40)          Communication with other providers:      Education provided to patient: Continue with theraputty    Adverse Reactions to treatment:  none  Timed Code Treatment Minutes:  45    Total Treatment Minutes: 45    Treatment/Activity Tolerance:     [x]  Patient tolerated treatment well []  Patient limited by fatique    [x]  Patient limited by pain []  Patient limited by other medical complications   []  Other:     Goals:    Long term goal 1: Pt. will increase R wrist AROM to WNL   Long term goal 2: Pt. will increase R ring and small finger AROM to WNL  Long term goal 3: Pt. will increase R supination to WNL  Long term goal 4: Pt. will increase R  to 80#   Long term goal 5: Pt. will increase R pinches 5#               Patient Requires Follow-up:  [x]  Yes  []  No    Plan: [x]  Continue per plan of care []  Alter current plan (see comments)   []  Plan of care initiated []  Hold pending MD visit []  Discharge    Plan for Next Session:      Electronically signed by:  Shai BARILLAS 11/20/2017 6:14 PM

## 2017-11-29 ENCOUNTER — HOSPITAL ENCOUNTER (OUTPATIENT)
Dept: OCCUPATIONAL THERAPY | Age: 42
Discharge: HOME | End: 2017-11-29

## 2017-11-29 NOTE — FLOWSHEET NOTE
[x][facility] [address]      NICO [facility] [address]       [facility], [address], [address]       Gene Cervantes 61     [phone] [phone] [phone]  ________________________________________________________________________  Occupational Therapy Daily Treatment Note    Date:  2017  Patient Name:  Wonda Gottron    :  1975  Restrictions/Precautions:  none  Diagnosis:   Boxers fx  Treatment Diagnosis:  weakness  Insurance/Certification information: [place] 90 visits combined PT/OT/ST   Referring Physician:   Hoang Pratt  Plan of care signed (Y/N):    Visit# / total visit  12  Pain level: 6/10 in ring finger when pushing to end range; 0/10 at rest    Subjective: States work is going well.  Has difficulty gripping smaller sheets of metal  Prior Level of Function:  independent  Patient Goals:     Treatment Flowsheet   Right Left                   massage x    MEM x     ROM and stretching to 4th and 5th digits x         digiflex 9# x      Pinch pin 6# with thumb and ring/little finger x      Wrist flex/ext 4# x 20 x      Supinator 1.5# x             Issued compression glove and instructed in wear and care Continues to wear                                                       Interventions/Modalities used:  [x] Therapeutic Exercise   [] Modalities:  [] Therapeutic Activity    [] Ultrasound [] Elec Stimulation   [] Total Motion Release    [] Fluido [] Kinesiotaping  [] Neuromuscular Re-education   [] Ionto [] Coldpack/hotpack   [x] Instruction in HEP    Other:    Objective Findings:  Edema still present RSF and RIF; wrist ext 90  Wrist flex 70 ;  R  77.9#  Lateral pinch 17#   Alicia pinch 17#-last session    Finger Extension/Flexion            RIGHT     Index  Long Ring Small    MPs  [facility] 0   PIPs  WFL WFL 0   0  (0)   DIPs WFL [facility] 0   0        Communication with other providers:      Education provided to patient: Continue with theraputty    Adverse Reactions to treatment:  none  Timed Code Treatment Minutes:  45    Total Treatment Minutes: 45    Treatment/Activity Tolerance:     [x]  Patient tolerated treatment well []  Patient limited by fatique    [x]  Patient limited by pain []  Patient limited by other medical complications   []  Other:     Goals:    Long term goal 1: Pt. will increase R wrist AROM to WNL   Long term goal 2: Pt. will increase R ring and small finger AROM to WNL  Long term goal 3: Pt. will increase R supination to WNL  Long term goal 4: Pt. will increase R  to 80#   Long term goal 5: Pt. will increase R pinches 5#               Patient Requires Follow-up:  [x]  Yes  []  No    Plan: [x]  Continue per plan of care []  Alter current plan (see comments)   []  Plan of care initiated []  Hold pending MD visit []  Discharge    Plan for Next Session:      Electronically signed by:  Rafael Dias CHT 11/29/2017 6:17 PM

## 2017-12-01 ENCOUNTER — HOSPITAL ENCOUNTER (OUTPATIENT)
Dept: OTHER | Age: 42
Discharge: HOME | End: 2017-12-31
Attending: PHYSICIAN ASSISTANT | Admitting: PHYSICIAN ASSISTANT

## 2017-12-08 ENCOUNTER — HOSPITAL ENCOUNTER (OUTPATIENT)
Dept: OCCUPATIONAL THERAPY | Age: 42
Discharge: HOME | End: 2017-12-08

## 2017-12-11 ENCOUNTER — HOSPITAL ENCOUNTER (OUTPATIENT)
Dept: OCCUPATIONAL THERAPY | Age: 42
Discharge: HOME | End: 2017-12-11

## 2017-12-11 NOTE — FLOWSHEET NOTE
treatment:  none  Timed Code Treatment Minutes:  45    Total Treatment Minutes: 45    Treatment/Activity Tolerance:     [x]  Patient tolerated treatment well []  Patient limited by fatique    [x]  Patient limited by pain []  Patient limited by other medical complications   []  Other:     Goals:    Long term goal 1: Pt. will increase R wrist AROM to WNL   Long term goal 2: Pt. will increase R ring and small finger AROM to WNL  Long term goal 3: Pt. will increase R supination to WNL  Long term goal 4: Pt. will increase R  to 80#   Long term goal 5: Pt. will increase R pinches 5#               Patient Requires Follow-up:  [x]  Yes  []  No    Plan: [x]  Continue per plan of care []  Alter current plan (see comments)   []  Plan of care initiated []  Hold pending MD visit []  Discharge    Plan for Next Session:      Electronically signed by:  Isidoro Polk CHT 12/11/2017 5:11 PM

## 2017-12-20 ENCOUNTER — HOSPITAL ENCOUNTER (OUTPATIENT)
Dept: OCCUPATIONAL THERAPY | Age: 42
Discharge: HOME | End: 2017-12-20

## 2017-12-20 NOTE — FLOWSHEET NOTE
[x][facility] [address]      NICO [facility] [address]       [address], [address], [address]       Gene Ramon [address]     [phone] [phone] [phone]  ________________________________________________________________________  Occupational Therapy Discharge    Date:  2017  Patient Name:  Brittnee Hernandes    :  1975  Restrictions/Precautions:  none  Diagnosis:   Boxers fx  Treatment Diagnosis:  weakness  Insurance/Certification information: [place] 05 visits combined PT/OT/ST   Referring Physician:   Naheed Lewis  Plan of care signed (Y/N): Y   Visit# / total visit  14  Pain level: Only some pain when stretching past where fingers want to go; 0/10 with normal use. Subjective: States can touch palm now most of time. Plans to continue HEP and call if any problems.   Prior Level of Function:  independent  Patient Goals:     Treatment Flowsheet   Right Left                   massage x    MEM x     ROM and stretching to 4th and 5th digits x         digiflex 9# x      Pinch pin 6# with thumb and ring/little finger x      Wrist flex/ext 5# x 20 x      Supinator 1.5# x             Issued compression glove and instructed in wear and care Continues to wear                                                       Interventions/Modalities used:  [x] Therapeutic Exercise   [] Modalities:  [] Therapeutic Activity    [] Ultrasound [] Elec Stimulation   [] Total Motion Release    [] Fluido [] Kinesiotaping  [] Neuromuscular Re-education   [] Ionto [] Coldpack/hotpack   [x] Instruction in HEP    Other:    Objective Findings:    R  103.0# ROM [facility]    Finger Extension/Flexion            RIGHT     Index  Long Ring Small    MPs  [facility] 0/90 0/85   PIPs  [facility] 0/95 0/95   DIPs [facility] 065   0/60            Communication with other providers:      Education provided to patient: Continue with theraputty    Adverse Reactions to treatment:  none  Timed Code Treatment Minutes:  45    Total Treatment Minutes: 45    Treatment/Activity Tolerance:     [x]  Patient tolerated treatment well []  Patient limited by fatique    [x]  Patient limited by pain []  Patient limited by other medical complications   []  Other:     Goals:    Long term goal 1: Pt. will increase R wrist AROM to WNL MET   Long term goal 2: Pt. will increase R ring and small finger AROM to WNL WFL  Long term goal 3: Pt. will increase R supination to WNL  Long term goal 4: Pt. will increase R  to 80# MET  Long term goal 5: Pt. will increase R pinches 5# MET               Patient Requires Follow-up:  [x]  Yes  []  No    Plan: []  Continue per plan of care []  Alter current plan (see comments)   []  Plan of care initiated []  Hold pending MD visit [x]  Discharge    Plan for Next Session:      Electronically signed by:  Dax Bishop CHT 12/20/2017 5:22 PM

## 2018-01-01 ENCOUNTER — HOSPITAL ENCOUNTER (OUTPATIENT)
Dept: OTHER | Age: 43
Discharge: HOME | End: 2018-01-31
Attending: PHYSICIAN ASSISTANT | Admitting: PHYSICIAN ASSISTANT

## 2019-01-14 ENCOUNTER — APPOINTMENT (OUTPATIENT)
Dept: CT IMAGING | Age: 44
End: 2019-01-14
Payer: COMMERCIAL

## 2019-01-14 ENCOUNTER — APPOINTMENT (OUTPATIENT)
Dept: GENERAL RADIOLOGY | Age: 44
End: 2019-01-14
Payer: COMMERCIAL

## 2019-01-14 ENCOUNTER — HOSPITAL ENCOUNTER (EMERGENCY)
Age: 44
Discharge: HOME | End: 2019-01-14
Payer: COMMERCIAL

## 2019-01-14 VITALS
HEIGHT: 74 IN | BODY MASS INDEX: 28.88 KG/M2 | HEART RATE: 59 BPM | SYSTOLIC BLOOD PRESSURE: 133 MMHG | OXYGEN SATURATION: 99 % | RESPIRATION RATE: 16 BRPM | DIASTOLIC BLOOD PRESSURE: 95 MMHG | TEMPERATURE: 98.1 F | WEIGHT: 225 LBS

## 2019-01-14 DIAGNOSIS — S22.050A CLOSED WEDGE COMPRESSION FRACTURE OF SIXTH THORACIC VERTEBRA, INITIAL ENCOUNTER: Primary | ICD-10-CM

## 2019-01-14 PROCEDURE — 72220 X-RAY EXAM SACRUM TAILBONE: CPT

## 2019-01-14 PROCEDURE — 99284 EMERGENCY DEPT VISIT MOD MDM: CPT

## 2019-01-14 PROCEDURE — 72131 CT LUMBAR SPINE W/O DYE: CPT

## 2019-01-14 PROCEDURE — 72128 CT CHEST SPINE W/O DYE: CPT

## 2019-01-14 PROCEDURE — 6370000000 HC RX 637 (ALT 250 FOR IP): Performed by: PHYSICIAN ASSISTANT

## 2019-01-14 PROCEDURE — 71045 X-RAY EXAM CHEST 1 VIEW: CPT

## 2019-01-14 PROCEDURE — 6360000002 HC RX W HCPCS: Performed by: PHYSICIAN ASSISTANT

## 2019-01-14 RX ORDER — HYDROCODONE BITARTRATE AND ACETAMINOPHEN 5; 325 MG/1; MG/1
1-2 TABLET ORAL EVERY 4 HOURS PRN
Qty: 15 TABLET | Refills: 0 | Status: SHIPPED | OUTPATIENT
Start: 2019-01-14 | End: 2019-01-17

## 2019-01-14 RX ORDER — ONDANSETRON 4 MG/1
4 TABLET, ORALLY DISINTEGRATING ORAL ONCE
Status: COMPLETED | OUTPATIENT
Start: 2019-01-14 | End: 2019-01-14

## 2019-01-14 RX ORDER — HYDROCODONE BITARTRATE AND ACETAMINOPHEN 5; 325 MG/1; MG/1
1 TABLET ORAL ONCE
Status: COMPLETED | OUTPATIENT
Start: 2019-01-14 | End: 2019-01-14

## 2019-01-14 RX ADMIN — HYDROCODONE BITARTRATE AND ACETAMINOPHEN 1 TABLET: 5; 325 TABLET ORAL at 10:16

## 2019-01-14 RX ADMIN — ONDANSETRON 4 MG: 4 TABLET, ORALLY DISINTEGRATING ORAL at 10:15

## 2019-01-14 ASSESSMENT — PAIN DESCRIPTION - ORIENTATION: ORIENTATION: MID;LOWER

## 2019-01-14 ASSESSMENT — PAIN DESCRIPTION - LOCATION: LOCATION: BACK

## 2019-01-14 ASSESSMENT — PAIN SCALES - GENERAL: PAINLEVEL_OUTOF10: 10

## 2019-01-14 ASSESSMENT — PAIN DESCRIPTION - PAIN TYPE: TYPE: ACUTE PAIN

## 2019-01-14 ASSESSMENT — PAIN DESCRIPTION - DESCRIPTORS: DESCRIPTORS: CONSTANT
